# Patient Record
(demographics unavailable — no encounter records)

---

## 2024-10-20 NOTE — HISTORY OF PRESENT ILLNESS
[de-identified] : Patient here for evaluation right knee pain.   NAD Right knee No skin breakdown Medial joint line ttp Positive holden Negative lachman Negative varus/valgus instability ROM 0-130 Pain with forced extension and flexion NVI Compartments soft and NT  new MRI is significant for anterior patella tendon edema and tendinitis  Plan I went over fines with the patient.  I reviewed the imaging in detail.   will cont cons tx pt hinged knee brace juan ordered fu in 1 month

## 2024-11-20 NOTE — HISTORY OF PRESENT ILLNESS
[de-identified] : Patient here for evaluation right knee pain. doing much better, no more pain  NAD Right knee No skin breakdown no joint line ttp neg holden Negative lachman Negative varus/valgus instability ROM 0-130 Pain with forced extension and flexion NVI Compartments soft and NT  new MRI is significant for anterior patella tendon edema and tendinitis  Plan I went over fines with the patient.  I reviewed the imaging in detail.   since he is doijng better with no pain and now with full strength, will return to sports